# Patient Record
Sex: FEMALE | Race: WHITE | NOT HISPANIC OR LATINO | Employment: OTHER | URBAN - METROPOLITAN AREA
[De-identification: names, ages, dates, MRNs, and addresses within clinical notes are randomized per-mention and may not be internally consistent; named-entity substitution may affect disease eponyms.]

---

## 2017-12-01 ENCOUNTER — APPOINTMENT (OUTPATIENT)
Dept: CARDIOLOGY | Facility: HOSPITAL | Age: 77
End: 2017-12-01
Attending: INTERNAL MEDICINE

## 2017-12-01 ENCOUNTER — APPOINTMENT (OUTPATIENT)
Dept: GENERAL RADIOLOGY | Facility: HOSPITAL | Age: 77
End: 2017-12-01

## 2017-12-01 ENCOUNTER — APPOINTMENT (OUTPATIENT)
Dept: CT IMAGING | Facility: HOSPITAL | Age: 77
End: 2017-12-01

## 2017-12-01 ENCOUNTER — APPOINTMENT (OUTPATIENT)
Dept: CT IMAGING | Facility: HOSPITAL | Age: 77
End: 2017-12-01
Attending: INTERNAL MEDICINE

## 2017-12-01 ENCOUNTER — HOSPITAL ENCOUNTER (INPATIENT)
Facility: HOSPITAL | Age: 77
LOS: 1 days | Discharge: ANOTHER HOSPITAL | End: 2017-12-02
Attending: EMERGENCY MEDICINE | Admitting: INTERNAL MEDICINE

## 2017-12-01 DIAGNOSIS — R74.8 ELEVATED CK: ICD-10-CM

## 2017-12-01 DIAGNOSIS — S00.83XA FACIAL CONTUSION, INITIAL ENCOUNTER: ICD-10-CM

## 2017-12-01 DIAGNOSIS — R40.0 SOMNOLENCE: ICD-10-CM

## 2017-12-01 DIAGNOSIS — E11.649 HYPOGLYCEMIA DUE TO TYPE 2 DIABETES MELLITUS (HCC): Primary | ICD-10-CM

## 2017-12-01 DIAGNOSIS — T68.XXXA HYPOTHERMIA, INITIAL ENCOUNTER: ICD-10-CM

## 2017-12-01 DIAGNOSIS — E11.65 POORLY CONTROLLED DIABETES MELLITUS (HCC): ICD-10-CM

## 2017-12-01 DIAGNOSIS — I50.9 ACUTE CONGESTIVE HEART FAILURE, UNSPECIFIED CONGESTIVE HEART FAILURE TYPE: ICD-10-CM

## 2017-12-01 PROBLEM — R29.6 UNWITNESSED FALL: Status: ACTIVE | Noted: 2017-12-01

## 2017-12-01 PROBLEM — S02.85XA ORBITAL FRACTURE, CLOSED, INITIAL ENCOUNTER (HCC): Status: ACTIVE | Noted: 2017-12-01

## 2017-12-01 PROBLEM — G93.41 ENCEPHALOPATHY, METABOLIC: Status: ACTIVE | Noted: 2017-12-01

## 2017-12-01 PROBLEM — T38.3X5A HYPOGLYCEMIA DUE TO INSULIN: Status: ACTIVE | Noted: 2017-12-01

## 2017-12-01 PROBLEM — E16.0 HYPOGLYCEMIA DUE TO INSULIN: Status: ACTIVE | Noted: 2017-12-01

## 2017-12-01 PROBLEM — S09.93XA FACIAL TRAUMA: Status: ACTIVE | Noted: 2017-12-01

## 2017-12-01 LAB
ALBUMIN SERPL-MCNC: 3.7 G/DL (ref 3.2–4.8)
ALBUMIN/GLOB SERPL: 1.6 G/DL (ref 1.5–2.5)
ALP SERPL-CCNC: 74 U/L (ref 25–100)
ALT SERPL W P-5'-P-CCNC: 33 U/L (ref 7–40)
AMPHET+METHAMPHET UR QL: NEGATIVE
AMPHETAMINES UR QL: NEGATIVE
ANION GAP SERPL CALCULATED.3IONS-SCNC: 4 MMOL/L (ref 3–11)
ARTERIAL PATENCY WRIST A: ABNORMAL
AST SERPL-CCNC: 29 U/L (ref 0–33)
ATMOSPHERIC PRESS: ABNORMAL MMHG
BACTERIA UR QL AUTO: ABNORMAL /HPF
BARBITURATES UR QL SCN: NEGATIVE
BASE EXCESS BLDA CALC-SCNC: -1.7 MMOL/L (ref 0–2)
BASOPHILS # BLD AUTO: 0.01 10*3/MM3 (ref 0–0.2)
BASOPHILS NFR BLD AUTO: 0.1 % (ref 0–1)
BDY SITE: ABNORMAL
BENZODIAZ UR QL SCN: NEGATIVE
BH CV ECHO MEAS - AO MAX PG (FULL): 11.1 MMHG
BH CV ECHO MEAS - AO MAX PG: 13 MMHG
BH CV ECHO MEAS - AO MEAN PG (FULL): 5.1 MMHG
BH CV ECHO MEAS - AO MEAN PG: 6 MMHG
BH CV ECHO MEAS - AO V2 MAX: 179.2 CM/SEC
BH CV ECHO MEAS - AO V2 MEAN: 113 CM/SEC
BH CV ECHO MEAS - AO V2 VTI: 42.2 CM
BH CV ECHO MEAS - AVA(I,A): 1.3 CM^2
BH CV ECHO MEAS - AVA(I,D): 1.3 CM^2
BH CV ECHO MEAS - AVA(V,A): 1.2 CM^2
BH CV ECHO MEAS - AVA(V,D): 1.2 CM^2
BH CV ECHO MEAS - BSA(HAYCOCK): 2.1 M^2
BH CV ECHO MEAS - BSA: 2 M^2
BH CV ECHO MEAS - BZI_BMI: 28.9 KILOGRAMS/M^2
BH CV ECHO MEAS - BZI_METRIC_HEIGHT: 172.7 CM
BH CV ECHO MEAS - BZI_METRIC_WEIGHT: 86.2 KG
BH CV ECHO MEAS - EDV(CUBED): 134.5 ML
BH CV ECHO MEAS - EDV(TEICH): 125.1 ML
BH CV ECHO MEAS - EF(CUBED): 44.6 %
BH CV ECHO MEAS - EF(TEICH): 36.9 %
BH CV ECHO MEAS - ESV(CUBED): 74.5 ML
BH CV ECHO MEAS - ESV(TEICH): 78.9 ML
BH CV ECHO MEAS - FS: 17.9 %
BH CV ECHO MEAS - IVS/LVPW: 0.94
BH CV ECHO MEAS - IVSD: 1.2 CM
BH CV ECHO MEAS - LA DIMENSION: 4.3 CM
BH CV ECHO MEAS - LAT PEAK E' VEL: 8.8 CM/SEC
BH CV ECHO MEAS - LV MASS(C)D: 257.1 GRAMS
BH CV ECHO MEAS - LV MASS(C)DI: 128.5 GRAMS/M^2
BH CV ECHO MEAS - LV MAX PG: 1.9 MMHG
BH CV ECHO MEAS - LV MEAN PG: 0.94 MMHG
BH CV ECHO MEAS - LV V1 MAX: 68.1 CM/SEC
BH CV ECHO MEAS - LV V1 MEAN: 44.4 CM/SEC
BH CV ECHO MEAS - LV V1 VTI: 17 CM
BH CV ECHO MEAS - LVIDD: 5.1 CM
BH CV ECHO MEAS - LVIDS: 4.2 CM
BH CV ECHO MEAS - LVOT AREA (M): 3.1 CM^2
BH CV ECHO MEAS - LVOT AREA: 3.3 CM^2
BH CV ECHO MEAS - LVOT DIAM: 2 CM
BH CV ECHO MEAS - LVPWD: 1.3 CM
BH CV ECHO MEAS - MED PEAK E' VEL: 5.24 CM/SEC
BH CV ECHO MEAS - MV A MAX VEL: 45.9 CM/SEC
BH CV ECHO MEAS - MV E MAX VEL: 95.3 CM/SEC
BH CV ECHO MEAS - MV E/A: 2.1
BH CV ECHO MEAS - PA ACC SLOPE: 804.9 CM/SEC^2
BH CV ECHO MEAS - PA ACC TIME: 0.11 SEC
BH CV ECHO MEAS - PA MAX PG: 3.7 MMHG
BH CV ECHO MEAS - PA PR(ACCEL): 29.9 MMHG
BH CV ECHO MEAS - PA V2 MAX: 96.6 CM/SEC
BH CV ECHO MEAS - PI END-D VEL: 83.6 CM/SEC
BH CV ECHO MEAS - RAP SYSTOLE: 3 MMHG
BH CV ECHO MEAS - RVSP: 32 MMHG
BH CV ECHO MEAS - SI(CUBED): 30 ML/M^2
BH CV ECHO MEAS - SI(LVOT): 28 ML/M^2
BH CV ECHO MEAS - SI(TEICH): 23.1 ML/M^2
BH CV ECHO MEAS - SV(CUBED): 60 ML
BH CV ECHO MEAS - SV(LVOT): 55.9 ML
BH CV ECHO MEAS - SV(TEICH): 46.2 ML
BH CV ECHO MEAS - TAPSE (>1.6): 3 CM2
BH CV ECHO MEAS - TR MAX VEL: 237.9 CM/SEC
BH CV VAS BP RIGHT ARM: NORMAL MMHG
BH CV XLRA - RV BASE: 3.8 CM
BH CV XLRA - RV LENGTH: 7.7 CM
BH CV XLRA - RV MID: 3.3 CM
BH CV XLRA - TDI S': 8 CM/SEC
BILIRUB SERPL-MCNC: 1.2 MG/DL (ref 0.3–1.2)
BILIRUB UR QL STRIP: NEGATIVE
BNP SERPL-MCNC: 54 PG/ML (ref 0–100)
BUN BLD-MCNC: 16 MG/DL (ref 9–23)
BUN/CREAT SERPL: 22.9 (ref 7–25)
BUPRENORPHINE SERPL-MCNC: NEGATIVE NG/ML
CALCIUM SPEC-SCNC: 8.3 MG/DL (ref 8.7–10.4)
CANNABINOIDS SERPL QL: NEGATIVE
CHLORIDE SERPL-SCNC: 111 MMOL/L (ref 99–109)
CK SERPL-CCNC: 200 U/L (ref 26–174)
CLARITY UR: CLEAR
CO2 BLDA-SCNC: 25.4 MMOL/L (ref 22–33)
CO2 SERPL-SCNC: 27 MMOL/L (ref 20–31)
COCAINE UR QL: NEGATIVE
COHGB MFR BLD: 1.7 % (ref 0–2)
COLOR UR: YELLOW
CORTIS SERPL-MCNC: 19.4 MCG/DL
CREAT BLD-MCNC: 0.7 MG/DL (ref 0.6–1.3)
D-LACTATE SERPL-SCNC: 1.2 MMOL/L (ref 0.5–2)
DEPRECATED RDW RBC AUTO: 59.8 FL (ref 37–54)
E/E' RATIO: 10.8
EOSINOPHIL # BLD AUTO: 0.03 10*3/MM3 (ref 0–0.3)
EOSINOPHIL NFR BLD AUTO: 0.3 % (ref 0–3)
ERYTHROCYTE [DISTWIDTH] IN BLOOD BY AUTOMATED COUNT: 17.7 % (ref 11.3–14.5)
GFR SERPL CREATININE-BSD FRML MDRD: 81 ML/MIN/1.73
GLOBULIN UR ELPH-MCNC: 2.3 GM/DL
GLUCOSE BLD-MCNC: 48 MG/DL (ref 70–100)
GLUCOSE BLDC GLUCOMTR-MCNC: 162 MG/DL (ref 70–130)
GLUCOSE BLDC GLUCOMTR-MCNC: 168 MG/DL (ref 70–130)
GLUCOSE BLDC GLUCOMTR-MCNC: 194 MG/DL (ref 70–130)
GLUCOSE BLDC GLUCOMTR-MCNC: 216 MG/DL (ref 70–130)
GLUCOSE BLDC GLUCOMTR-MCNC: 224 MG/DL (ref 70–130)
GLUCOSE BLDC GLUCOMTR-MCNC: 244 MG/DL (ref 70–130)
GLUCOSE BLDC GLUCOMTR-MCNC: 251 MG/DL (ref 70–130)
GLUCOSE BLDC GLUCOMTR-MCNC: 251 MG/DL (ref 70–130)
GLUCOSE BLDC GLUCOMTR-MCNC: 43 MG/DL (ref 70–130)
GLUCOSE BLDC GLUCOMTR-MCNC: 77 MG/DL (ref 70–130)
GLUCOSE BLDC GLUCOMTR-MCNC: 79 MG/DL (ref 70–130)
GLUCOSE UR STRIP-MCNC: ABNORMAL MG/DL
HBA1C MFR BLD: 7.4 % (ref 4.8–5.6)
HCO3 BLDA-SCNC: 24 MMOL/L (ref 20–26)
HCT VFR BLD AUTO: 32.7 % (ref 34.5–44)
HCT VFR BLD CALC: 31.6 %
HGB BLD-MCNC: 10 G/DL (ref 11.5–15.5)
HGB BLDA-MCNC: 10.3 G/DL (ref 14–18)
HGB UR QL STRIP.AUTO: NEGATIVE
HOROWITZ INDEX BLD+IHG-RTO: 36 %
HYALINE CASTS UR QL AUTO: ABNORMAL /LPF
IMM GRANULOCYTES # BLD: 0.02 10*3/MM3 (ref 0–0.03)
IMM GRANULOCYTES NFR BLD: 0.2 % (ref 0–0.6)
KETONES UR QL STRIP: NEGATIVE
LEFT ATRIUM VOLUME INDEX: 33.5 ML/M2
LEUKOCYTE ESTERASE UR QL STRIP.AUTO: NEGATIVE
LIPASE SERPL-CCNC: 28 U/L (ref 6–51)
LV EF 2D ECHO EST: 65 %
LYMPHOCYTES # BLD AUTO: 0.4 10*3/MM3 (ref 0.6–4.8)
LYMPHOCYTES NFR BLD AUTO: 4.2 % (ref 24–44)
MCH RBC QN AUTO: 28.1 PG (ref 27–31)
MCHC RBC AUTO-ENTMCNC: 30.6 G/DL (ref 32–36)
MCV RBC AUTO: 91.9 FL (ref 80–99)
METHADONE UR QL SCN: NEGATIVE
METHGB BLD QL: 0.9 % (ref 0–1.5)
MODALITY: ABNORMAL
MONOCYTES # BLD AUTO: 0.44 10*3/MM3 (ref 0–1)
MONOCYTES NFR BLD AUTO: 4.6 % (ref 0–12)
NEUTROPHILS # BLD AUTO: 8.58 10*3/MM3 (ref 1.5–8.3)
NEUTROPHILS NFR BLD AUTO: 90.6 % (ref 41–71)
NITRITE UR QL STRIP: NEGATIVE
OPIATES UR QL: NEGATIVE
OXYCODONE UR QL SCN: NEGATIVE
OXYHGB MFR BLDV: 95.3 % (ref 94–99)
PCO2 BLDA: 44.3 MM HG (ref 35–45)
PCP UR QL SCN: NEGATIVE
PH BLDA: 7.34 PH UNITS (ref 7.35–7.45)
PH UR STRIP.AUTO: 7.5 [PH] (ref 5–8)
PLATELET # BLD AUTO: 236 10*3/MM3 (ref 150–450)
PMV BLD AUTO: 9.7 FL (ref 6–12)
PO2 BLDA: 98 MM HG (ref 83–108)
POTASSIUM BLD-SCNC: 4.6 MMOL/L (ref 3.5–5.5)
PROCALCITONIN SERPL-MCNC: 0.28 NG/ML
PROPOXYPH UR QL: NEGATIVE
PROT SERPL-MCNC: 6 G/DL (ref 5.7–8.2)
PROT UR QL STRIP: ABNORMAL
RBC # BLD AUTO: 3.56 10*6/MM3 (ref 3.89–5.14)
RBC # UR: ABNORMAL /HPF
REF LAB TEST METHOD: ABNORMAL
SODIUM BLD-SCNC: 142 MMOL/L (ref 132–146)
SP GR UR STRIP: 1.02 (ref 1–1.03)
SQUAMOUS #/AREA URNS HPF: ABNORMAL /HPF
TRICYCLICS UR QL SCN: NEGATIVE
TROPONIN I SERPL-MCNC: 0.01 NG/ML
TSH SERPL DL<=0.05 MIU/L-ACNC: 0.82 MIU/ML (ref 0.35–5.35)
UROBILINOGEN UR QL STRIP: ABNORMAL
WBC NRBC COR # BLD: 9.48 10*3/MM3 (ref 3.5–10.8)
WBC UR QL AUTO: ABNORMAL /HPF

## 2017-12-01 PROCEDURE — 87641 MR-STAPH DNA AMP PROBE: CPT | Performed by: INTERNAL MEDICINE

## 2017-12-01 PROCEDURE — 87040 BLOOD CULTURE FOR BACTERIA: CPT | Performed by: EMERGENCY MEDICINE

## 2017-12-01 PROCEDURE — 82962 GLUCOSE BLOOD TEST: CPT

## 2017-12-01 PROCEDURE — 70486 CT MAXILLOFACIAL W/O DYE: CPT

## 2017-12-01 PROCEDURE — 80053 COMPREHEN METABOLIC PANEL: CPT | Performed by: EMERGENCY MEDICINE

## 2017-12-01 PROCEDURE — 25010000002 GLUCAGON (RDNA) PER 1 MG: Performed by: INTERNAL MEDICINE

## 2017-12-01 PROCEDURE — 93306 TTE W/DOPPLER COMPLETE: CPT | Performed by: INTERNAL MEDICINE

## 2017-12-01 PROCEDURE — 70450 CT HEAD/BRAIN W/O DYE: CPT

## 2017-12-01 PROCEDURE — 25010000002 PIPERACILLIN SOD-TAZOBACTAM PER 1 G: Performed by: INTERNAL MEDICINE

## 2017-12-01 PROCEDURE — 51702 INSERT TEMP BLADDER CATH: CPT

## 2017-12-01 PROCEDURE — 71010 HC CHEST PA OR AP: CPT

## 2017-12-01 PROCEDURE — 84484 ASSAY OF TROPONIN QUANT: CPT | Performed by: INTERNAL MEDICINE

## 2017-12-01 PROCEDURE — 93306 TTE W/DOPPLER COMPLETE: CPT

## 2017-12-01 PROCEDURE — 80306 DRUG TEST PRSMV INSTRMNT: CPT | Performed by: INTERNAL MEDICINE

## 2017-12-01 PROCEDURE — 84484 ASSAY OF TROPONIN QUANT: CPT | Performed by: EMERGENCY MEDICINE

## 2017-12-01 PROCEDURE — 83880 ASSAY OF NATRIURETIC PEPTIDE: CPT | Performed by: EMERGENCY MEDICINE

## 2017-12-01 PROCEDURE — 93005 ELECTROCARDIOGRAM TRACING: CPT | Performed by: EMERGENCY MEDICINE

## 2017-12-01 PROCEDURE — 84145 PROCALCITONIN (PCT): CPT | Performed by: INTERNAL MEDICINE

## 2017-12-01 PROCEDURE — 25010000002 SULFUR HEXAFLUORIDE MICROSPH 60.7-25 MG RECONSTITUTED SUSPENSION: Performed by: INTERNAL MEDICINE

## 2017-12-01 PROCEDURE — 72125 CT NECK SPINE W/O DYE: CPT

## 2017-12-01 PROCEDURE — 84443 ASSAY THYROID STIM HORMONE: CPT | Performed by: EMERGENCY MEDICINE

## 2017-12-01 PROCEDURE — 94640 AIRWAY INHALATION TREATMENT: CPT

## 2017-12-01 PROCEDURE — 82550 ASSAY OF CK (CPK): CPT | Performed by: EMERGENCY MEDICINE

## 2017-12-01 PROCEDURE — 81001 URINALYSIS AUTO W/SCOPE: CPT | Performed by: EMERGENCY MEDICINE

## 2017-12-01 PROCEDURE — 87502 INFLUENZA DNA AMP PROBE: CPT | Performed by: INTERNAL MEDICINE

## 2017-12-01 PROCEDURE — 83036 HEMOGLOBIN GLYCOSYLATED A1C: CPT | Performed by: INTERNAL MEDICINE

## 2017-12-01 PROCEDURE — 87086 URINE CULTURE/COLONY COUNT: CPT | Performed by: NURSE PRACTITIONER

## 2017-12-01 PROCEDURE — 83605 ASSAY OF LACTIC ACID: CPT | Performed by: EMERGENCY MEDICINE

## 2017-12-01 PROCEDURE — 82805 BLOOD GASES W/O2 SATURATION: CPT | Performed by: EMERGENCY MEDICINE

## 2017-12-01 PROCEDURE — 72170 X-RAY EXAM OF PELVIS: CPT

## 2017-12-01 PROCEDURE — 99285 EMERGENCY DEPT VISIT HI MDM: CPT

## 2017-12-01 PROCEDURE — 99291 CRITICAL CARE FIRST HOUR: CPT | Performed by: INTERNAL MEDICINE

## 2017-12-01 PROCEDURE — 36600 WITHDRAWAL OF ARTERIAL BLOOD: CPT | Performed by: EMERGENCY MEDICINE

## 2017-12-01 PROCEDURE — 73560 X-RAY EXAM OF KNEE 1 OR 2: CPT

## 2017-12-01 PROCEDURE — 85025 COMPLETE CBC W/AUTO DIFF WBC: CPT | Performed by: EMERGENCY MEDICINE

## 2017-12-01 PROCEDURE — 82533 TOTAL CORTISOL: CPT | Performed by: INTERNAL MEDICINE

## 2017-12-01 PROCEDURE — 25010000002 ENOXAPARIN PER 10 MG: Performed by: INTERNAL MEDICINE

## 2017-12-01 PROCEDURE — 83690 ASSAY OF LIPASE: CPT | Performed by: EMERGENCY MEDICINE

## 2017-12-01 PROCEDURE — 25010000002 VANCOMYCIN HCL IN NACL 1.75-0.9 GM/500ML-% SOLUTION: Performed by: INTERNAL MEDICINE

## 2017-12-01 RX ORDER — FUROSEMIDE 40 MG/1
80 TABLET ORAL DAILY
COMMUNITY

## 2017-12-01 RX ORDER — DEXTROSE AND SODIUM CHLORIDE 5; .45 G/100ML; G/100ML
75 INJECTION, SOLUTION INTRAVENOUS CONTINUOUS
Status: DISCONTINUED | OUTPATIENT
Start: 2017-12-01 | End: 2017-12-02 | Stop reason: HOSPADM

## 2017-12-01 RX ORDER — DEXTROSE MONOHYDRATE 25 G/50ML
INJECTION, SOLUTION INTRAVENOUS
Status: COMPLETED
Start: 2017-12-01 | End: 2017-12-01

## 2017-12-01 RX ORDER — VANCOMYCIN HYDROCHLORIDE 1 G/200ML
1000 INJECTION, SOLUTION INTRAVENOUS EVERY 12 HOURS
Status: DISCONTINUED | OUTPATIENT
Start: 2017-12-02 | End: 2017-12-02 | Stop reason: HOSPADM

## 2017-12-01 RX ORDER — BUDESONIDE AND FORMOTEROL FUMARATE DIHYDRATE 80; 4.5 UG/1; UG/1
2 AEROSOL RESPIRATORY (INHALATION)
COMMUNITY

## 2017-12-01 RX ORDER — DOCUSATE SODIUM 100 MG/1
100 CAPSULE, LIQUID FILLED ORAL DAILY
Status: DISCONTINUED | OUTPATIENT
Start: 2017-12-01 | End: 2017-12-02 | Stop reason: HOSPADM

## 2017-12-01 RX ORDER — DEXTROSE MONOHYDRATE 25 G/50ML
50 INJECTION, SOLUTION INTRAVENOUS
Status: DISCONTINUED | OUTPATIENT
Start: 2017-12-01 | End: 2017-12-02 | Stop reason: HOSPADM

## 2017-12-01 RX ORDER — ATORVASTATIN CALCIUM 10 MG/1
10 TABLET, FILM COATED ORAL NIGHTLY
Status: DISCONTINUED | OUTPATIENT
Start: 2017-12-01 | End: 2017-12-02 | Stop reason: HOSPADM

## 2017-12-01 RX ORDER — ONDANSETRON 2 MG/ML
4 INJECTION INTRAMUSCULAR; INTRAVENOUS EVERY 6 HOURS PRN
Status: DISCONTINUED | OUTPATIENT
Start: 2017-12-01 | End: 2017-12-02 | Stop reason: HOSPADM

## 2017-12-01 RX ORDER — IPRATROPIUM BROMIDE AND ALBUTEROL SULFATE 2.5; .5 MG/3ML; MG/3ML
3 SOLUTION RESPIRATORY (INHALATION) EVERY 4 HOURS PRN
COMMUNITY

## 2017-12-01 RX ORDER — ASPIRIN 81 MG/1
81 TABLET, CHEWABLE ORAL DAILY
Status: DISCONTINUED | OUTPATIENT
Start: 2017-12-01 | End: 2017-12-02 | Stop reason: HOSPADM

## 2017-12-01 RX ORDER — BUDESONIDE AND FORMOTEROL FUMARATE DIHYDRATE 80; 4.5 UG/1; UG/1
2 AEROSOL RESPIRATORY (INHALATION)
Status: DISCONTINUED | OUTPATIENT
Start: 2017-12-01 | End: 2017-12-02 | Stop reason: HOSPADM

## 2017-12-01 RX ORDER — ONDANSETRON 2 MG/ML
8 INJECTION INTRAMUSCULAR; INTRAVENOUS ONCE
Status: DISCONTINUED | OUTPATIENT
Start: 2017-12-01 | End: 2017-12-02 | Stop reason: HOSPADM

## 2017-12-01 RX ORDER — WARFARIN SODIUM 2.5 MG/1
2.5 TABLET ORAL
COMMUNITY

## 2017-12-01 RX ORDER — DEXTROSE 20 G/100ML
100 INJECTION, SOLUTION INTRAVENOUS CONTINUOUS
Status: DISCONTINUED | OUTPATIENT
Start: 2017-12-01 | End: 2017-12-01

## 2017-12-01 RX ORDER — DEXTROSE MONOHYDRATE 100 MG/ML
100 INJECTION, SOLUTION INTRAVENOUS CONTINUOUS
Status: DISCONTINUED | OUTPATIENT
Start: 2017-12-01 | End: 2017-12-01

## 2017-12-01 RX ORDER — ATORVASTATIN CALCIUM 10 MG/1
10 TABLET, FILM COATED ORAL NIGHTLY
COMMUNITY

## 2017-12-01 RX ORDER — ALBUTEROL SULFATE 90 UG/1
2 AEROSOL, METERED RESPIRATORY (INHALATION) EVERY 4 HOURS PRN
COMMUNITY

## 2017-12-01 RX ORDER — ASPIRIN 81 MG/1
81 TABLET, CHEWABLE ORAL DAILY
COMMUNITY

## 2017-12-01 RX ORDER — DEXTROSE MONOHYDRATE 25 G/50ML
50 INJECTION, SOLUTION INTRAVENOUS
Status: DISCONTINUED | OUTPATIENT
Start: 2017-12-01 | End: 2017-12-01

## 2017-12-01 RX ORDER — DOCUSATE SODIUM 100 MG/1
100 CAPSULE, LIQUID FILLED ORAL DAILY
COMMUNITY

## 2017-12-01 RX ORDER — CARVEDILOL 12.5 MG/1
12.5 TABLET ORAL 2 TIMES DAILY WITH MEALS
COMMUNITY

## 2017-12-01 RX ORDER — SODIUM CHLORIDE 0.9 % (FLUSH) 0.9 %
10 SYRINGE (ML) INJECTION AS NEEDED
Status: DISCONTINUED | OUTPATIENT
Start: 2017-12-01 | End: 2017-12-02 | Stop reason: HOSPADM

## 2017-12-01 RX ORDER — LORAZEPAM 0.5 MG/1
0.5 TABLET ORAL AS NEEDED
COMMUNITY

## 2017-12-01 RX ORDER — IPRATROPIUM BROMIDE AND ALBUTEROL SULFATE 2.5; .5 MG/3ML; MG/3ML
3 SOLUTION RESPIRATORY (INHALATION) EVERY 6 HOURS PRN
Status: DISCONTINUED | OUTPATIENT
Start: 2017-12-01 | End: 2017-12-02 | Stop reason: HOSPADM

## 2017-12-01 RX ORDER — VANCOMYCIN 1.75 GRAM/500 ML IN 0.9 % SODIUM CHLORIDE INTRAVENOUS
20 ONCE
Status: COMPLETED | OUTPATIENT
Start: 2017-12-01 | End: 2017-12-01

## 2017-12-01 RX ORDER — DEXTROSE MONOHYDRATE 25 G/50ML
25 INJECTION, SOLUTION INTRAVENOUS ONCE
Status: COMPLETED | OUTPATIENT
Start: 2017-12-01 | End: 2017-12-01

## 2017-12-01 RX ADMIN — DEXTROSE MONOHYDRATE 50 ML: 25 INJECTION, SOLUTION INTRAVENOUS at 14:34

## 2017-12-01 RX ADMIN — TAZOBACTAM SODIUM AND PIPERACILLIN SODIUM 4.5 G: 500; 4 INJECTION, SOLUTION INTRAVENOUS at 18:01

## 2017-12-01 RX ADMIN — DEXTROSE MONOHYDRATE 25 G: 25 INJECTION, SOLUTION INTRAVENOUS at 11:25

## 2017-12-01 RX ADMIN — NITROGLYCERIN 1 INCH: 20 OINTMENT TOPICAL at 11:40

## 2017-12-01 RX ADMIN — DEXTROSE AND SODIUM CHLORIDE 75 ML/HR: 5; 450 INJECTION, SOLUTION INTRAVENOUS at 20:36

## 2017-12-01 RX ADMIN — SULFUR HEXAFLUORIDE 2 ML: KIT at 15:53

## 2017-12-01 RX ADMIN — Medication 1750 MG: at 18:01

## 2017-12-01 RX ADMIN — SODIUM CHLORIDE: 234 INJECTION INTRAMUSCULAR; INTRAVENOUS; SUBCUTANEOUS at 13:54

## 2017-12-01 RX ADMIN — BUDESONIDE AND FORMOTEROL FUMARATE DIHYDRATE 2 PUFF: 80; 4.5 AEROSOL RESPIRATORY (INHALATION) at 22:00

## 2017-12-01 RX ADMIN — GLUCAGON HYDROCHLORIDE 4 MG/HR: 1 INJECTION, POWDER, FOR SOLUTION INTRAMUSCULAR; INTRAVENOUS; SUBCUTANEOUS at 14:53

## 2017-12-01 NOTE — PROGRESS NOTES
No surgical back up for orbital and sinus fractures.  Discussed with patients brother who is at the bedside.  Will try to facilitate transfer to [facility].  In the meantime, broad spectrum antibiotics.

## 2017-12-01 NOTE — PLAN OF CARE
Problem: Patient Care Overview (Adult)  Goal: Plan of Care Review  Outcome: Ongoing (interventions implemented as appropriate)    12/01/17 1736   Coping/Psychosocial Response Interventions   Plan Of Care Reviewed With patient   Patient Care Overview   Progress improving   Outcome Evaluation   Outcome Summary/Follow up Plan Able to maintain BG following transfer from ED. Patient increasingly alert.        Goal: Adult Individualization and Mutuality  Outcome: Ongoing (interventions implemented as appropriate)  Goal: Discharge Needs Assessment  Outcome: Ongoing (interventions implemented as appropriate)    Problem: Diabetes, Type 2 (Adult)  Goal: Signs and Symptoms of Listed Potential Problems Will be Absent or Manageable (Diabetes, Type 2)  Outcome: Ongoing (interventions implemented as appropriate)    Problem: Confusion, Acute (Adult)  Goal: Identify Related Risk Factors and Signs and Symptoms  Outcome: Ongoing (interventions implemented as appropriate)  Goal: Cognitive/Functional Impairments Minimized  Outcome: Ongoing (interventions implemented as appropriate)  Goal: Safety  Outcome: Ongoing (interventions implemented as appropriate)

## 2017-12-01 NOTE — NURSING NOTE
Discussed case with ENT who states that he does not consult on orbital floor fractures. Discussed with Dr. Dos Santos who states he will attempt to contact physician at .

## 2017-12-01 NOTE — H&P
"INTENSIVIST / PULMONARY INITIAL VISIT (CONSULT / H&P) NOTE     Hospital:  LOS: 0 days   Ms. Nelly Grullon, 77 y.o. female is followed for:   Chief Complaint   Patient presents with   • Hypoglycemia     Active Problems:    * No active hospital problems. *         History of Present Illness   All history obtained by report from ED physician, patient altered and no family at bedside.  Reportedly patient was last seen \"normal\" around 6 pm yesterday.  She is on a large dose of levemir for T2DM.  No-one was able to reach her and family went the house where she was \"found down\".  Patient was unresponsive with bruising on her face.  Her blood sugar was markedly low on EMS arrival.  She was found to be normotensive, hypothermic with temp of 90.6 degrees, and profoundly hypoglycemic.  No obvious signs of infection and CT head appears grossly negative, report pending.  The working diagnosis is profound hypoglycemia from unintentional levemir overdose.    Past Medical History:   Diagnosis Date   • Anxiety    • Asthma    • Atrial fibrillation    • CHF (congestive heart failure)    • Depression    • Diabetes mellitus    • Hyperlipidemia    • Hypertension    • Neuropathy      Past Surgical History:   Procedure Laterality Date   •  SECTION     • CHOLECYSTECTOMY     • PACEMAKER IMPLANTATION       History reviewed. No pertinent family history.  Social History     Social History   • Marital status: Unknown     Spouse name: N/A   • Number of children: N/A   • Years of education: N/A     Occupational History   • Not on file.     Social History Main Topics   • Smoking status: Never Smoker   • Smokeless tobacco: Not on file   • Alcohol use No   • Drug use: No   • Sexual activity: Not on file     Other Topics Concern   • Not on file     Social History Narrative   • No narrative on file     No Known Allergies  No current facility-administered medications on file prior to encounter.      No current outpatient prescriptions on " "file prior to encounter.       ROS:  Per HPI, all other systems were reviewed and were negative        OBJECTIVE     Vital Sign Min/Max for last 24 hours  Temp  Min: 90.3 °F (32.4 °C)  Max: 91.6 °F (33.1 °C)   BP  Min: 120/77  Max: 138/81   Pulse  Min: 60  Max: 60   Resp  Min: 20  Max: 20   SpO2  Min: 92 %  Max: 100 %   Flow (L/min)  Min: 2  Max: 2     Telemetry:  Sinus Rhythm: paced rhythm           General Appearance:  Somnolent, responds to voice and pain, diaphoretic  Eyes:  No scleral icterus or pallor, pupils normal  Ears, Nose, Mouth, Throat:  Atraumatic, oropharynx clear  Neck:  Trachea midline, thyroid normal  Respiratory:  Clear to auscultation bilaterally, normal effort, no tenderness to palpation  Cardiovascular:  Regular rate and rhythm, no murmurs, no peripheral edema, no thrill  Gastrointestinal:  Soft, non-tender, non-distended, no hepatosplenomegaly  Skin:  Normal temperature, no rash, bruising particularly around left orbit, eye \"swollen shut\", pupil/iris appear intact, swollen conjuctiva, no obvious globe injury  Psychiatric:  Confused, somnolent but rousable  Neuro:  No focal neurologic deficits observed    SpO2: 92 % SpO2  Min: 92 %  Max: 100 %   Device: nasal cannula    Flow Rate: 2 Flow (L/min)  Min: 2  Max: 2   Intake/Ouptut 24 hrs (7:00AM - 6:59 AM)  Intake & Output (last 3 days)       11/28 0701 - 11/29 0700 11/29 0701 - 11/30 0700 11/30 0701 - 12/01 0700 12/01 0701 - 12/02 0700    I.V. (mL/kg)    150 (1.7)    Total Intake(mL/kg)    150 (1.7)    Net       +150                  Lines, Drains & Airways    Active LDAs     Name:   Placement date:   Placement time:   Site:   Days:    Peripheral IV Line - Single Lumen 12/01/17 1111 cephalic vein (lateral side of arm), left 18 gauge  12/01/17    1111      less than 1    Peripheral IV Line - Single Lumen 12/01/17 1126 metacarpal vein (top of hand), right 20 gauge  12/01/17    1126      less than 1    Urethral Catheter 12/01/17 1136 10 10  12/01/17  "   1136      less than 1                Hematology:    Results from last 7 days  Lab Units 12/01/17  1300   WBC 10*3/mm3 9.48   HEMOGLOBIN g/dL 10.0*   HEMATOCRIT % 32.7*   PLATELETS 10*3/mm3 236     Electrolytes, Magnesium and Phosphorus:    Results from last 7 days  Lab Units 12/01/17  1300   SODIUM mmol/L 142   POTASSIUM mmol/L 4.6   CO2 mmol/L 27.0     Renal:    Results from last 7 days  Lab Units 12/01/17  1300   CREATININE mg/dL 0.70   BUN mg/dL 16     Estimated Creatinine Clearance: 68.8 mL/min (by C-G formula based on Cr of 0.7).  Estimated Creatinine Clearance: 68.8 mL/min (by C-G formula based on Cr of 0.7).  Hepatic:    Results from last 7 days  Lab Units 12/01/17  1300   ALK PHOS U/L 74   BILIRUBIN mg/dL 1.2   ALT (SGPT) U/L 33   AST (SGOT) U/L 29     Arterial Blood Gases:    Results from last 7 days  Lab Units 12/01/17  1200   PH, ARTERIAL pH units 7.343*   PCO2, ARTERIAL mm Hg 44.3   PO2 ART mm Hg 98.0   FIO2 % 36             Lab Results   Component Value Date    LACTATE 1.2 12/01/2017       Xr Chest 1 View    Result Date: 12/1/2017  Narrative: EXAMINATION: XR CHEST 1 VW- 12/01/2017  INDICATION: AMS- found down  COMPARISON: NONE  FINDINGS: 1. Cardiomegaly is noted. There is evidence of pulmonary edema both vascular and interstitial diffusely. Comparison images are not available. 2. There is no pneumothorax and no free fluid.         Impression: Evidence of cardiomegaly with vascular and interstitial diffuse edema, likely cardiac etiology. Free fluid is not appreciated.  D:  12/01/2017 E:  12/01/2017         Relevant imaging studies and labs from 12/01/17 were reviewed and interpreted by me    Medications (drips):    dextrose   glucagon infusion 0.1 mg/mL         glucagon (human recombinant) 4 mg Intravenous Once   ondansetron 8 mg Intravenous Once       Assessment/Plan   IMPRESSION / PLAN     Inpatient Problem List:  77 y.o.female:       Impression:  77 y.o.female with relevant PMH of Afib, IDDM  admitted 12/1/2017 with severe hypoglycemia, hypothermia, and altered mental status with otherwise normal vitals.  Differential include intentional or unintentional insulin overdose, sepsis, myxedema, addrenal insufficiency, etc.    Plan:    Hypoglycemia - d10 gtt, check FS q1h, glucagon gtt until stabilized, check a1c    -pan culture    -f/u read of CT head    -check CT orbits and maxilla to r/o injury    -Echo, repeat troponin    -check TSH, Cortisol, Procalcitonin, urine drug screen    -SCDs, lovenox    -low threshold for consideration of MRI brain / LP / CT Abdomen+Pelvis to look for alternative explanations if she fails to improve rapidly    Critical Care time spent in direct patient care: 30 minutes (excluding procedure time, if applicable) including high complexity decision making to assess, manipulate, and support vital organ system failure in this individual who has impairment of one or more vital organ systems such that there is a high probability of imminent or life threatening deterioration in the patient’s condition.       Jian Dos Santos MD  Intensive Care Medicine  12/01/17 2:06 PM

## 2017-12-01 NOTE — PROGRESS NOTES
Continued Stay Note  [facility]     Patient Name: Nelly Grullon  MRN: [id number]  Today's Date: 12/1/2017    Admit Date: 12/1/2017          Discharge Plan       12/01/17 1602    Case Management/Social Work Plan    Additional Comments Patient sleeping and has been confused.  Daughter had just left.  Spoke with patient's brother at bedside.  Received consult that patient had recently been discharged from [facility].  Called [facility] and spoke with Pooja Em confirmed patient was discharged from their facility August 4, 2017.  Brother states that patient lives alone ahd has been at home since that discharge.  Brother states that patient has had multiple falls, she stays in a wheelchair all of the time and can not walk.  Brother feels patient will need placement.  Requested brother have daughter contact CM.  CM will continue to follow.              Discharge Codes     None            Louisa Isabel RN

## 2017-12-01 NOTE — ED PROVIDER NOTES
Subjective   HPI Comments: Nelly Grullon is a 77 y.o.female who presents to the ED with c/o hypoglycemia. Per EMS, she was found unresponsive this morning by her daughter. Her daughter was unable to arose her so she promptly called EMS. Per her daughter, her last known well was yesterday at 1800. Upon EMS arrival, she was unresponsive to stimuli. En route to the ED she became alert but was unresponsive. Here in the ED, she is alert, responsive, but moderately confused. She reports a history of diabetes mellitus. A complete HPI is limited due to acuity of condition.     Patient is a 77 y.o. female presenting with hypoglycemia.   History provided by:  EMS personnel and patient  History limited by:  Acuity of condition  Hypoglycemia   Initial blood sugar:  79  Blood sugar after intervention:  77  Severity:  Moderate  Onset quality:  Unable to specify  Timing:  Constant  Progression:  Worsening  Chronicity:  New  Diabetic status:  Unable to specify  Ineffective treatments:  Glucagon  Associated symptoms: altered mental status, decreased responsiveness, obesity and speech difficulty    Risk factors: family hx of diabetes and family hx of hypoglycemia        Review of Systems   Unable to perform ROS: Mental status change   Constitutional: Positive for decreased responsiveness.   Neurological: Positive for speech difficulty.       Past Medical History:   Diagnosis Date   • Anxiety    • Asthma    • Atrial fibrillation    • CHF (congestive heart failure)    • Depression    • Diabetes mellitus    • Hyperlipidemia    • Hypertension    • Neuropathy        No Known Allergies    Past Surgical History:   Procedure Laterality Date   •  SECTION     • CHOLECYSTECTOMY     • PACEMAKER IMPLANTATION         History reviewed. No pertinent family history.    Social History     Social History   • Marital status: Unknown     Spouse name: N/A   • Number of children: N/A   • Years of education: N/A     Social History Main Topics    • Smoking status: Never Smoker   • Smokeless tobacco: None   • Alcohol use No   • Drug use: No   • Sexual activity: Not Asked     Other Topics Concern   • None     Social History Narrative   • None         Objective   Physical Exam   Constitutional: She appears well-developed and well-nourished. She appears distressed.   HENT:   Head: Normocephalic and atraumatic.   Nose: Nose normal.   Eyes: No scleral icterus.   Periorbital ecchymosis bilaterally.     Neck: Normal range of motion. Neck supple.   Cardiovascular: Normal rate and regular rhythm.    No murmur heard.  Pulmonary/Chest: No respiratory distress.   Abdominal: Soft.   Musculoskeletal: She exhibits edema.   1+ pitting edema bilaterally.   Facial edema.    Neurological:   Patient is somnolent with slow slurred speech.   Skin: There is erythema.   Facial ecchymosis.     Psychiatric: She is slowed.   Nursing note and vitals reviewed.      Critical Care  Performed by: IVETT HO  Authorized by: IVETT HO     Critical care provider statement:     Critical care time (minutes):  60    Critical care time was exclusive of:  Separately billable procedures and treating other patients and teaching time    Critical care was necessary to treat or prevent imminent or life-threatening deterioration of the following conditions:  Endocrine crisis    Critical care was time spent personally by me on the following activities:  Development of treatment plan with patient or surrogate, discussions with consultants, evaluation of patient's response to treatment, examination of patient, obtaining history from patient or surrogate, ordering and performing treatments and interventions, ordering and review of laboratory studies, ordering and review of radiographic studies, pulse oximetry, re-evaluation of patient's condition and review of old charts    I assumed direction of critical care for this patient from another provider in my specialty: no                  ED Course  ED Course   Value Comment By Time   Temp: (!) 90.6 °F (32.6 °C) (Reviewed) Miki Rodriguez MD 12/01 1147   Temp: (!) 91 °F (32.8 °C) (Reviewed) Miki Rodriguez MD 12/01 1238    Patient with marked hypothermia which is likely related to prolonged episode of hypoglycemia.  Patient after glucagon as well as two amps of D50 continues to have maximal blood sugar of 77.  We'll start the D10 drip. Miki Rodriguez MD 12/01 1252    Dr. Rodriguez is updating the patient's family upon current findings. Ariel Corrales 12/01 1306    Case discussed with Dr. Dos Santos who agrees to admit. Miki Rodriguez MD 12/01 1335   Creatine Kinase: (!) 200 (Reviewed) Miki Rodriguez MD 12/01 1405                     MDM  Number of Diagnoses or Management Options     Amount and/or Complexity of Data Reviewed  Clinical lab tests: reviewed  Tests in the radiology section of CPT®: reviewed  Decide to obtain previous medical records or to obtain history from someone other than the patient: yes  Obtain history from someone other than the patient: yes  Discuss the patient with other providers: yes  Independent visualization of images, tracings, or specimens: yes    Critical Care  Total time providing critical care: 30-74 minutes      Final diagnoses:   Hypoglycemia due to type 2 diabetes mellitus   Somnolence   Hypothermia, initial encounter   Facial contusion, initial encounter   Poorly controlled diabetes mellitus   Acute congestive heart failure, unspecified congestive heart failure type   Elevated CK       Documentation assistance provided by jemal Corrales.  Information recorded by the scribe was done at my direction and has been verified and validated by me.     Ariel Corrales  12/01/17 1204       Ariel Corrales  12/01/17 1332       Ariel Corrales  12/01/17 1334       Ariel Corrales  12/01/17 1337       Miki Rodriguez MD  12/01/17 3980

## 2017-12-02 VITALS
TEMPERATURE: 98.8 F | OXYGEN SATURATION: 98 % | WEIGHT: 190 LBS | DIASTOLIC BLOOD PRESSURE: 64 MMHG | SYSTOLIC BLOOD PRESSURE: 134 MMHG | HEIGHT: 68 IN | HEART RATE: 67 BPM | BODY MASS INDEX: 28.79 KG/M2 | RESPIRATION RATE: 18 BRPM

## 2017-12-02 LAB
ALBUMIN SERPL-MCNC: 3.2 G/DL (ref 3.2–4.8)
ALBUMIN/GLOB SERPL: 1.6 G/DL (ref 1.5–2.5)
ALP SERPL-CCNC: 75 U/L (ref 25–100)
ALT SERPL W P-5'-P-CCNC: 31 U/L (ref 7–40)
ANION GAP SERPL CALCULATED.3IONS-SCNC: 7 MMOL/L (ref 3–11)
AST SERPL-CCNC: 23 U/L (ref 0–33)
BASOPHILS # BLD AUTO: 0.01 10*3/MM3 (ref 0–0.2)
BASOPHILS NFR BLD AUTO: 0.2 % (ref 0–1)
BILIRUB SERPL-MCNC: 1.3 MG/DL (ref 0.3–1.2)
BUN BLD-MCNC: 17 MG/DL (ref 9–23)
BUN/CREAT SERPL: 21.3 (ref 7–25)
CALCIUM SPEC-SCNC: 8.3 MG/DL (ref 8.7–10.4)
CHLORIDE SERPL-SCNC: 109 MMOL/L (ref 99–109)
CO2 SERPL-SCNC: 23 MMOL/L (ref 20–31)
CREAT BLD-MCNC: 0.8 MG/DL (ref 0.6–1.3)
DEPRECATED RDW RBC AUTO: 58.9 FL (ref 37–54)
EOSINOPHIL # BLD AUTO: 0.18 10*3/MM3 (ref 0–0.3)
EOSINOPHIL NFR BLD AUTO: 3.3 % (ref 0–3)
ERYTHROCYTE [DISTWIDTH] IN BLOOD BY AUTOMATED COUNT: 17.4 % (ref 11.3–14.5)
FLUAV SUBTYP SPEC NAA+PROBE: NOT DETECTED
FLUBV RNA ISLT QL NAA+PROBE: NOT DETECTED
GFR SERPL CREATININE-BSD FRML MDRD: 70 ML/MIN/1.73
GLOBULIN UR ELPH-MCNC: 2 GM/DL
GLUCOSE BLD-MCNC: 194 MG/DL (ref 70–100)
GLUCOSE BLDC GLUCOMTR-MCNC: 185 MG/DL (ref 70–130)
GLUCOSE BLDC GLUCOMTR-MCNC: 185 MG/DL (ref 70–130)
GLUCOSE BLDC GLUCOMTR-MCNC: 191 MG/DL (ref 70–130)
GLUCOSE BLDC GLUCOMTR-MCNC: 196 MG/DL (ref 70–130)
GLUCOSE BLDC GLUCOMTR-MCNC: 201 MG/DL (ref 70–130)
GLUCOSE BLDC GLUCOMTR-MCNC: 204 MG/DL (ref 70–130)
GLUCOSE BLDC GLUCOMTR-MCNC: 213 MG/DL (ref 70–130)
HCT VFR BLD AUTO: 30.1 % (ref 34.5–44)
HGB BLD-MCNC: 9.3 G/DL (ref 11.5–15.5)
IMM GRANULOCYTES # BLD: 0.01 10*3/MM3 (ref 0–0.03)
IMM GRANULOCYTES NFR BLD: 0.2 % (ref 0–0.6)
LYMPHOCYTES # BLD AUTO: 0.51 10*3/MM3 (ref 0.6–4.8)
LYMPHOCYTES NFR BLD AUTO: 9.5 % (ref 24–44)
MAGNESIUM SERPL-MCNC: 1.9 MG/DL (ref 1.3–2.7)
MCH RBC QN AUTO: 28.4 PG (ref 27–31)
MCHC RBC AUTO-ENTMCNC: 30.9 G/DL (ref 32–36)
MCV RBC AUTO: 91.8 FL (ref 80–99)
MONOCYTES # BLD AUTO: 0.49 10*3/MM3 (ref 0–1)
MONOCYTES NFR BLD AUTO: 9.1 % (ref 0–12)
MRSA DNA SPEC QL NAA+PROBE: NEGATIVE
NEUTROPHILS # BLD AUTO: 4.19 10*3/MM3 (ref 1.5–8.3)
NEUTROPHILS NFR BLD AUTO: 77.7 % (ref 41–71)
PHOSPHATE SERPL-MCNC: 2.5 MG/DL (ref 2.4–5.1)
PLATELET # BLD AUTO: 234 10*3/MM3 (ref 150–450)
PMV BLD AUTO: 9.5 FL (ref 6–12)
POTASSIUM BLD-SCNC: 4.7 MMOL/L (ref 3.5–5.5)
PROT SERPL-MCNC: 5.2 G/DL (ref 5.7–8.2)
RBC # BLD AUTO: 3.28 10*6/MM3 (ref 3.89–5.14)
SODIUM BLD-SCNC: 139 MMOL/L (ref 132–146)
WBC NRBC COR # BLD: 5.39 10*3/MM3 (ref 3.5–10.8)

## 2017-12-02 PROCEDURE — 94760 N-INVAS EAR/PLS OXIMETRY 1: CPT

## 2017-12-02 PROCEDURE — 25010000002 PIPERACILLIN SOD-TAZOBACTAM PER 1 G: Performed by: INTERNAL MEDICINE

## 2017-12-02 PROCEDURE — 85025 COMPLETE CBC W/AUTO DIFF WBC: CPT | Performed by: INTERNAL MEDICINE

## 2017-12-02 PROCEDURE — 94799 UNLISTED PULMONARY SVC/PX: CPT

## 2017-12-02 PROCEDURE — 80053 COMPREHEN METABOLIC PANEL: CPT | Performed by: INTERNAL MEDICINE

## 2017-12-02 PROCEDURE — 94640 AIRWAY INHALATION TREATMENT: CPT

## 2017-12-02 PROCEDURE — 25010000002 VANCOMYCIN PER 500 MG: Performed by: INTERNAL MEDICINE

## 2017-12-02 PROCEDURE — 82962 GLUCOSE BLOOD TEST: CPT

## 2017-12-02 PROCEDURE — 83735 ASSAY OF MAGNESIUM: CPT | Performed by: INTERNAL MEDICINE

## 2017-12-02 PROCEDURE — 84100 ASSAY OF PHOSPHORUS: CPT | Performed by: INTERNAL MEDICINE

## 2017-12-02 PROCEDURE — 99233 SBSQ HOSP IP/OBS HIGH 50: CPT | Performed by: INTERNAL MEDICINE

## 2017-12-02 RX ORDER — LORAZEPAM 0.5 MG/1
0.5 TABLET ORAL ONCE
Status: COMPLETED | OUTPATIENT
Start: 2017-12-02 | End: 2017-12-02

## 2017-12-02 RX ADMIN — TAZOBACTAM SODIUM AND PIPERACILLIN SODIUM 4.5 G: 500; 4 INJECTION, SOLUTION INTRAVENOUS at 07:49

## 2017-12-02 RX ADMIN — LORAZEPAM 0.5 MG: 0.5 TABLET ORAL at 00:32

## 2017-12-02 RX ADMIN — BUDESONIDE AND FORMOTEROL FUMARATE DIHYDRATE 2 PUFF: 80; 4.5 AEROSOL RESPIRATORY (INHALATION) at 08:10

## 2017-12-02 RX ADMIN — TAZOBACTAM SODIUM AND PIPERACILLIN SODIUM 4.5 G: 500; 4 INJECTION, SOLUTION INTRAVENOUS at 00:32

## 2017-12-02 RX ADMIN — VANCOMYCIN HYDROCHLORIDE 1000 MG: 1 INJECTION, SOLUTION INTRAVENOUS at 06:35

## 2017-12-02 NOTE — PLAN OF CARE
Problem: Diabetes, Type 2 (Adult)  Goal: Signs and Symptoms of Listed Potential Problems Will be Absent or Manageable (Diabetes, Type 2)  Outcome: Ongoing (interventions implemented as appropriate)    12/01/17 2235   Diabetes, Type 2   Problems Assessed (Type 2 Diabetes) impaired glycemic control;hypoglycemia;situational response   Problems Present (Type 2 Diabetes) situational response         Problem: Confusion, Acute (Adult)  Goal: Identify Related Risk Factors and Signs and Symptoms    12/01/17 2235   Confusion, Acute   Related Risk Factors (Acute Confusion) advanced age       Goal: Cognitive/Functional Impairments Minimized  Outcome: Ongoing (interventions implemented as appropriate)    12/01/17 2235   Confusion, Acute (Adult)   Cognitive/Functional Impairments Minimized making progress toward outcome       Goal: Safety  Outcome: Ongoing (interventions implemented as appropriate)

## 2017-12-02 NOTE — PLAN OF CARE
Problem: Patient Care Overview (Adult)  Goal: Plan of Care Review  Outcome: Ongoing (interventions implemented as appropriate)    12/02/17 0644   Coping/Psychosocial Response Interventions   Plan Of Care Reviewed With patient;daughter   Patient Care Overview   Progress no change   Outcome Evaluation   Outcome Summary/Follow up Plan Pt. being transferred for higher level of care due to facial fractures

## 2017-12-02 NOTE — DISCHARGE SUMMARY
"DISCHARGE SUMMARY     Admit date: 2017  Date of Discharge:  2017    Discharge Diagnoses  Hospital Problem List     * (Principal)Hypoglycemia probably due to LA insulin    Encephalopathy, metabolic    Unwitnessed fall    Facial trauma    Orbital fracture, closed    Hypothermia          Past Surgical History:   Procedure Laterality Date   •  SECTION     • CHOLECYSTECTOMY     • PACEMAKER IMPLANTATION         History of Present Illness    Patient is a 77 y.o. female presented with: Hypoglycemia due to insulin    Per admission H&P:    All history obtained by report from ED physician, patient altered and no family at bedside.  Reportedly patient was last seen \"normal\" around 6 pm yesterday.  She is on a large dose of levemir for T2DM.  No-one was able to reach her and family went the house where she was \"found down\".  Patient was unresponsive with bruising on her face.  Her blood sugar was markedly low on EMS arrival.  She was found to be normotensive, hypothermic with temp of 90.6 degrees, and profoundly hypoglycemic.  No obvious signs of infection and CT head appears grossly negative, report pending.  The working diagnosis is profound hypoglycemia from unintentional levemir overdose.    Hospital Course    Patient was admitted to the ICU on Glucagon and D10 drips with passive warming.  Glucose increased to 200 range and Glucagon was discontinued.  Patient awake, alert, disoriented. On 2L/min NC and no pressors.    Maxillofacial CT obtained with results as below:    1. Left inferior orbital rim blowout fracture with displaced fracture  fragment into the left maxillary antrum which is totally opacified  likely from acute blood products and fluid.  2. Minimal cortical irregularity of the left lamina papyracea concerning  for small minimally displaced fracture of the left medial orbital rim in  the setting of trauma.  3. Mucosal edema and opacification of the ethmoid air cells consistent  with acute blood " products and edema    Due to lack of [facility] surgery coverage [facility] was contacted to accept patient in transfer.  At this time, CT of C-Spine is pending in meantime patient will be in a hard c-collar.  Emperic broad spectrum antibiotics started.    Procedures Performed:  None  Scheduled Meds:  [START ON 12/3/2017] Pharmacy Consult  Does not apply Once   aspirin 81 mg Oral Daily   atorvastatin 10 mg Oral Nightly   budesonide-formoterol 2 puff Inhalation BID - RT   docusate sodium 100 mg Oral Daily   enoxaparin 40 mg Subcutaneous Daily   glucagon (human recombinant) 4 mg Intravenous Once   ondansetron 8 mg Intravenous Once   [START ON 12/2/2017] piperacillin-tazobactam 4.5 g Intravenous Q8H   [START ON 12/2/2017] vancomycin 1,000 mg Intravenous Q12H   vancomycin 20 mg/kg Intravenous Once     Continuous Infusions:  dextrose 100 mL/hr    glucagon infusion 0.1 mg/mL 4 mg/hr Last Rate: 4 mg/hr (12/01/17 1453)   Pharmacy to dose vancomycin       PRN Meds:.dextrose  •  influenza vaccine  •  ipratropium-albuterol  •  ondansetron  •  Pharmacy to dose vancomycin  •  Insert peripheral IV **AND** sodium chloride    Pertinent Test Results:     Results from last 7 days  Lab Units 12/01/17  1300   WBC 10*3/mm3 9.48   HEMOGLOBIN g/dL 10.0*   HEMATOCRIT % 32.7*   PLATELETS 10*3/mm3 236       Results from last 7 days  Lab Units 12/01/17  1300   SODIUM mmol/L 142   POTASSIUM mmol/L 4.6   CHLORIDE mmol/L 111*   CO2 mmol/L 27.0   BUN mg/dL 16   CREATININE mg/dL 0.70   GLUCOSE mg/dL 48*   CALCIUM mg/dL 8.3*       Results from last 7 days  Lab Units 12/01/17  1300   HEMOGLOBIN A1C % 7.40*       Condition on Discharge:  Stable    Discharge Disposition: To [facility] for higher level of care/surgical consultation    Discharge Diet: NPO    Activity at Discharge: Bed rest    Test Results Pending at Discharge   Order Current Status    Blood Culture - Blood, In process    Blood Culture - Blood, In process    Urine Culture - Urine, Urine, Clean Catch In  process    Urine Drug Screen - Urine, Clean Catch In process           Sekou Dos Santos MD  12/01/17  7:01 PM    Discharge Time Spent: 35 Minutes

## 2017-12-02 NOTE — PROGRESS NOTES
Discharge Planning Assessment  [facility]     Patient Name: Nelly Grullon  MRN: [id number]  Today's Date: 12/1/2017    Admit Date: 12/1/2017          Discharge Needs Assessment     None            Discharge Plan       12/01/17 1902    Case Management/Social Work Plan    Plan d/c    Additional Comments EMMA spoke with Alisa, 2A ICU re: transfer to . Request made to set up ambulance on will call. CM called [facility], [phone], and spoke with Ben to schedule ambulance. EMMA updated Alisa on the floor informing that ambulance is available should they need it. Form was tubed to ICU, 420        Discharge Placement     No information found                Demographic Summary     None            Functional Status     None            Psychosocial     None            Abuse/Neglect     None            Legal     None            Substance Abuse     None            Patient Forms     None          Amber Brown

## 2017-12-02 NOTE — PROGRESS NOTES
Intensive Care Follow-up      LOS: 1 day     Ms. Nelly Grullon, 77 y.o. female is followed for: Hypoglycemia due to insulin     Subjective - Interval History     Patient apparently will fall yesterday related to accidental overdose of insulin.  She has multiple facial fractures and is to transfer to [facility] shortly.    The patient's relevant past medical, surgical and social history were reviewed and updated in Epic as appropriate.     Objective     Infusions:    dextrose 5 % and sodium chloride 0.45 % 75 mL/hr Last Rate: 75 mL/hr (12/01/17 2036)   Pharmacy to dose vancomycin       Medications:    [START ON 12/3/2017] Pharmacy Consult  Does not apply Once   aspirin 81 mg Oral Daily   atorvastatin 10 mg Oral Nightly   budesonide-formoterol 2 puff Inhalation BID - RT   docusate sodium 100 mg Oral Daily   enoxaparin 40 mg Subcutaneous Daily   glucagon (human recombinant) 4 mg Intravenous Once   ondansetron 8 mg Intravenous Once   piperacillin-tazobactam 4.5 g Intravenous Q8H   vancomycin 1,000 mg Intravenous Q12H       Vital Sign Min/Max for last 24 hours  Temp  Min: 90.3 °F (32.4 °C)  Max: 98.8 °F (37.1 °C)   BP  Min: 91/45  Max: 146/67   Pulse  Min: 60  Max: 84   Resp  Min: 16  Max: 24   SpO2  Min: 90 %  Max: 100 %   Flow (L/min)  Min: 2  Max: 2      Intake/Output Summary (Last 24 hours) at 12/02/17 0730  Last data filed at 12/02/17 0635   Gross per 24 hour   Intake           2056.6 ml   Output              880 ml   Net           1176.6 ml           Physical Exam:   GENERAL: Awake and responding to questions   HEENT: Has facial bruising   LUNGS: Fairly clear   HEART: Normal S1 and S2   ABDOMEN: Soft   EXTREMITIES: Trace pedal edema   NEURO/PSYCH: No focal deficits      Results from last 7 days  Lab Units 12/02/17  0304 12/01/17  1300   WBC 10*3/mm3 5.39 9.48   HEMOGLOBIN g/dL 9.3* 10.0*   PLATELETS 10*3/mm3 234 236       Results from last 7 days  Lab Units 12/02/17  0304 12/01/17  1300   SODIUM mmol/L 139 142    POTASSIUM mmol/L 4.7 4.6   CO2 mmol/L 23.0 27.0   BUN mg/dL 17 16   CREATININE mg/dL 0.80 0.70   MAGNESIUM mg/dL 1.9  --    PHOSPHORUS mg/dL 2.5  --    GLUCOSE mg/dL 194* 48*     Estimated Creatinine Clearance: 68.8 mL/min (by C-G formula based on Cr of 0.8).      Results from last 7 days  Lab Units 12/01/17  1300   HEMOGLOBIN A1C % 7.40*         Results from last 7 days  Lab Units 12/01/17  1200   PH, ARTERIAL pH units 7.343*   PCO2, ARTERIAL mm Hg 44.3   PO2 ART mm Hg 98.0     Lab Results   Component Value Date    LACTATE 1.2 12/01/2017          Images: No new images this morning    I reviewed the patient's results and images.     Impression      Hospital Problem List     * (Principal)Hypoglycemia probably due to LA insulin    Encephalopathy, metabolic    Unwitnessed fall    Facial trauma    Orbital fracture, closed    Hypothermia               Plan        Patient seems fairly stable this morning.  Her discharge summary was done last evening by Dr. Dos Santos.  She seems ready for transfer as soon as transport as arranged.     Plan of care and goals reviewed with nurse at daily rounds.   I discussed the patient's findings and my recommendations with patient and nursing staff       Sekou Escudero MD  Pulmonary and Critical Care Medicine  12/02/17 7:30 AM

## 2017-12-02 NOTE — DISCHARGE INSTRUCTIONS
Transferring to [facility] for higher level of care due to facial fractures.  Patient room is 10th Floor [facility] Room # [address]

## 2017-12-02 NOTE — NURSING NOTE
Telephone report given to Bryan OSEI at [facility]. Call back number for questions provided.

## 2017-12-02 NOTE — NURSING NOTE
C-Collar placed per MD order. C-spine immobilization maintained at all times. Pt could AMBROSE pre and post application.    Derrick Woods RN  Clinical House Supervisor

## 2017-12-03 LAB — BACTERIA SPEC AEROBE CULT: NORMAL

## 2017-12-06 LAB
BACTERIA SPEC AEROBE CULT: NORMAL
BACTERIA SPEC AEROBE CULT: NORMAL